# Patient Record
Sex: FEMALE | Race: WHITE | Employment: UNEMPLOYED | URBAN - METROPOLITAN AREA
[De-identification: names, ages, dates, MRNs, and addresses within clinical notes are randomized per-mention and may not be internally consistent; named-entity substitution may affect disease eponyms.]

---

## 2019-05-01 ENCOUNTER — HOSPITAL ENCOUNTER (OUTPATIENT)
Age: 59
Discharge: HOME | End: 2019-05-01
Payer: COMMERCIAL

## 2019-05-01 ENCOUNTER — HOSPITAL ENCOUNTER (OUTPATIENT)
Dept: PREADMISSION TESTING | Age: 59
Discharge: HOME | End: 2019-05-05
Payer: COMMERCIAL

## 2019-05-01 VITALS
SYSTOLIC BLOOD PRESSURE: 161 MMHG | WEIGHT: 238 LBS | BODY MASS INDEX: 37.35 KG/M2 | DIASTOLIC BLOOD PRESSURE: 87 MMHG | HEIGHT: 67 IN | OXYGEN SATURATION: 100 % | RESPIRATION RATE: 20 BRPM | HEART RATE: 55 BPM | TEMPERATURE: 98.5 F

## 2019-05-01 LAB
ANION GAP SERPL CALCULATED.3IONS-SCNC: 12 MMOL/L (ref 9–17)
BUN BLDV-MCNC: 13 MG/DL (ref 6–20)
BUN/CREAT BLD: NORMAL (ref 9–20)
CALCIUM SERPL-MCNC: 9.4 MG/DL (ref 8.6–10.4)
CHLORIDE BLD-SCNC: 104 MMOL/L (ref 98–107)
CO2: 25 MMOL/L (ref 20–31)
CREAT SERPL-MCNC: 0.72 MG/DL (ref 0.5–0.9)
GFR AFRICAN AMERICAN: >60 ML/MIN
GFR NON-AFRICAN AMERICAN: >60 ML/MIN
GFR SERPL CREATININE-BSD FRML MDRD: NORMAL ML/MIN/{1.73_M2}
GFR SERPL CREATININE-BSD FRML MDRD: NORMAL ML/MIN/{1.73_M2}
GLUCOSE BLD-MCNC: 96 MG/DL (ref 70–99)
HCT VFR BLD CALC: 40.2 % (ref 36.3–47.1)
HEMOGLOBIN: 13.3 G/DL (ref 11.9–15.1)
POTASSIUM SERPL-SCNC: 4.2 MMOL/L (ref 3.7–5.3)
SODIUM BLD-SCNC: 141 MMOL/L (ref 135–144)

## 2019-05-01 PROCEDURE — 85014 HEMATOCRIT: CPT

## 2019-05-01 PROCEDURE — 85018 HEMOGLOBIN: CPT

## 2019-05-01 PROCEDURE — 36415 COLL VENOUS BLD VENIPUNCTURE: CPT

## 2019-05-01 PROCEDURE — 80048 BASIC METABOLIC PNL TOTAL CA: CPT

## 2019-05-01 PROCEDURE — 93005 ELECTROCARDIOGRAM TRACING: CPT

## 2019-05-01 RX ORDER — SODIUM CHLORIDE, SODIUM LACTATE, POTASSIUM CHLORIDE, CALCIUM CHLORIDE 600; 310; 30; 20 MG/100ML; MG/100ML; MG/100ML; MG/100ML
1000 INJECTION, SOLUTION INTRAVENOUS CONTINUOUS
Status: CANCELLED | OUTPATIENT
Start: 2019-05-01

## 2019-05-01 RX ORDER — ACETAMINOPHEN 500 MG
1000 TABLET ORAL EVERY 6 HOURS PRN
COMMUNITY

## 2019-05-01 RX ORDER — BIOTIN 10 MG
1 TABLET ORAL DAILY
COMMUNITY

## 2019-05-01 NOTE — H&P
History and Physical    Pt Name: Ivonne Curran  MRN: [id number]  YOB: 1960  Date of evaluation: 5/1/2019    SUBJECTIVE:   History of Chief Complaint:    Patient complains of lumbar pain, BLE pain. 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 She has had cervical spine surgery in the past.  She says that she has worse symptoms in the PM, has failed conservative treatment. She has been scheduled for lumbar fusion. Past Medical History    has a past medical history of Cervical disc disease, Depression, GERD (gastroesophageal reflux disease), Hashimoto's disease, Hypertension, Lumbar spinal stenosis, Sleep apnea, and Thyroid disease. Past Surgical History   has a past surgical history that includes Hysterectomy, total abdominal (2004); Cholecystectomy, laparoscopic (1994); cervical fusion (2016); Colonoscopy; and Tonsillectomy and adenoidectomy. Medications  Prior to Admission medications    Medication Sig Start Date End Date Taking? Authorizing Provider   Sertraline HCl (ZOLOFT PO) Take 100 mg by mouth daily   Yes Historical Provider, MD   OMEPRAZOLE PO Take 20 mg by mouth nightly   Yes Historical Provider, MD   Calcium Carbonate (CALCIUM 600 PO) Take 1 tablet by mouth daily   Yes Historical Provider, MD   Multiple Vitamins-Minerals (MULTIVITAMIN ADULT) CHEW Take 1 tablet by mouth daily   Yes Historical Provider, MD   Cholecalciferol (VITAMIN D3 PO) Take 800 Units by mouth daily   Yes Historical Provider, MD   GABAPENTIN PO Take 300 mg by mouth 3 times daily   Yes Historical Provider, MD   acetaminophen (TYLENOL) 500 MG tablet Take 1,000 mg by mouth every 6 hours as needed for Pain   Yes Historical Provider, MD   Ibuprofen (MOTRIN PO) Take 800 mg by mouth nightly as needed   Yes Historical Provider, MD     Allergies  is allergic to codeine and other.   Family History  family history includes Arthritis in her father and mother; Cancer in her maternal grandfather and paternal grandmother; Diabetes in her maternal grandmother; Heart Attack in her paternal grandfather; Heart Failure in her maternal grandmother; High Blood Pressure in her father, maternal grandmother, and sister; Thyroid Cancer in her brother; Thyroid Disease in her brother, sister, and sister. Social History   reports that she quit smoking about 40 years ago. Her smoking use included cigarettes. She started smoking about 42 years ago. She has never used smokeless tobacco.     reports that she drank alcohol. reports that she has current or past drug history. Marital Status   Occupation recently stopped working, RN    OBJECTIVE:   VITALS:  height is 5' 7\" (1.702 m) and weight is 238 lb (108 kg). Her oral temperature is 98.5 °F (36.9 °C). Her blood pressure is 161/87 (abnormal) and her pulse is 55. Her respiration is 20 and oxygen saturation is 100%. CONSTITUTIONAL:alert & oriented x 3, no acute distress. Very pleasant. SKIN:  Warm and dry, no rashes on exposed areas of skin. HEAD:  Normocephalic, atraumatic   EYES: PERRL. EOMs intact. EARS:  Hearing grossly WNL. TM intact and clear bilaterally. NOSE:  Nares patent. No rhinorrhea. MOUTH/THROAT:  benign  NECK:supple, no lymphadenopathy  LUNGS: Clear to auscultation bilaterally, no wheezes. CARDIOVASCULAR: Heart sounds are normal.  Regular rate and rhythm without murmur. ABDOMEN: soft, non tender, non distended. EXTREMITIES: no edema bilateral lower extremities. Testing:   EK19  Labs pending: drawn 2019     IMPRESSIONS:   1. Lumbar disc disease  2.  has a past medical history of Cervical disc disease, Depression, GERD (gastroesophageal reflux disease), Hashimoto's disease, Hypertension (), Lumbar spinal stenosis (2019), Sleep apnea, and Thyroid disease. PLANS:   1.  Lumbar fusion    ARIANA VO PA-C  Electronically signed 2019 at 11:15 AM

## 2019-05-02 LAB
EKG ATRIAL RATE: 61 BPM
EKG P AXIS: 72 DEGREES
EKG P-R INTERVAL: 172 MS
EKG Q-T INTERVAL: 444 MS
EKG QRS DURATION: 96 MS
EKG QTC CALCULATION (BAZETT): 446 MS
EKG R AXIS: 40 DEGREES
EKG T AXIS: 51 DEGREES
EKG VENTRICULAR RATE: 61 BPM

## 2025-05-14 ENCOUNTER — OFFICE VISIT (OUTPATIENT)
Dept: NEUROLOGY | Age: 65
End: 2025-05-14
Payer: COMMERCIAL

## 2025-05-14 VITALS
HEIGHT: 67 IN | BODY MASS INDEX: 36.88 KG/M2 | SYSTOLIC BLOOD PRESSURE: 122 MMHG | DIASTOLIC BLOOD PRESSURE: 74 MMHG | HEART RATE: 64 BPM | WEIGHT: 235 LBS

## 2025-05-14 DIAGNOSIS — R41.89 SUBJECTIVE MEMORY COMPLAINTS: Primary | ICD-10-CM

## 2025-05-14 DIAGNOSIS — Z91.89 DRIVING SAFETY ISSUE: ICD-10-CM

## 2025-05-14 DIAGNOSIS — F39 AFFECTIVE DISORDER: ICD-10-CM

## 2025-05-14 DIAGNOSIS — G25.2 ACTION TREMOR: ICD-10-CM

## 2025-05-14 PROCEDURE — 3017F COLORECTAL CA SCREEN DOC REV: CPT | Performed by: PSYCHIATRY & NEUROLOGY

## 2025-05-14 PROCEDURE — 99204 OFFICE O/P NEW MOD 45 MIN: CPT | Performed by: PSYCHIATRY & NEUROLOGY

## 2025-05-14 PROCEDURE — G8427 DOCREV CUR MEDS BY ELIG CLIN: HCPCS | Performed by: PSYCHIATRY & NEUROLOGY

## 2025-05-14 PROCEDURE — 1036F TOBACCO NON-USER: CPT | Performed by: PSYCHIATRY & NEUROLOGY

## 2025-05-14 PROCEDURE — G8417 CALC BMI ABV UP PARAM F/U: HCPCS | Performed by: PSYCHIATRY & NEUROLOGY

## 2025-05-14 RX ORDER — BUPROPION HYDROCHLORIDE 300 MG/1
300 TABLET ORAL EVERY MORNING
COMMUNITY
Start: 2025-02-19

## 2025-05-14 RX ORDER — LAMOTRIGINE 25 MG/1
TABLET ORAL
COMMUNITY

## 2025-05-14 RX ORDER — LAMOTRIGINE 100 MG/1
100 TABLET ORAL DAILY
COMMUNITY
Start: 2025-04-23

## 2025-05-14 RX ORDER — BUSPIRONE HYDROCHLORIDE 15 MG/1
15 TABLET ORAL 2 TIMES DAILY
COMMUNITY

## 2025-05-14 RX ORDER — LISINOPRIL 10 MG/1
10 TABLET ORAL EVERY MORNING
COMMUNITY

## 2025-05-14 RX ORDER — LEVOTHYROXINE SODIUM 137 UG/1
137 TABLET ORAL EVERY MORNING
COMMUNITY

## 2025-05-14 RX ORDER — HYDROCHLOROTHIAZIDE 25 MG/1
25 TABLET ORAL EVERY MORNING
COMMUNITY

## 2025-05-14 RX ORDER — ARIPIPRAZOLE 2 MG/1
2 TABLET ORAL NIGHTLY
COMMUNITY
Start: 2025-04-24

## 2025-05-14 NOTE — PROGRESS NOTES
NEUROLOGY CONSULT  Patient Name:       Emelia Donnelly  :        1960  Clinic Visit Date:    2025          Dear Nik Eldridge MD     I had the opportunity to see your patient, Ms. Emelia Donnelly in neurology consultation today. As you know she  is a 64 y.o.  female with c/o memory difficulties.  She presented to clinic by herself. Most of the history is obtained from the patient herself.   She reports experiencing memory issues, despite having passed a neuropsychology exam in 2024 with superior vocabulary and average overall performance. She frequently misplaces her phone and has been reprimanded by her neurologist for using semantic paraphasias. Often, she substitutes incorrect words, such as \"flower\" for \"grass,\" and occasionally struggles with reading comprehension. Visual-spatial difficulties are also noted, such as veering to the left while driving and parking. Her confusion levels vary daily. Currently on Lyrica, she believes it may be contributing to her symptoms and is attempting to reduce her dosage to one nightly pill. Despite this, she continues to experience a foggy sensation.   She frequently repeats stories and forgets steps in processes. She volunteers at her [employer] but often forgets where she has been. Previously a nurse and a , she now struggles to recall scientific names of plants and ordinary things. Multiple MRIs were conducted due to suspected MS, as she veers to the left while walking and experiences numbness in her left foot. However, MS has been ruled out by MS Specialist, Dr. Santiago following brain, spine MRIs and lumbar puncture. She lives with her  and manages her daily activities independently.  She is a highly functioning individual and she has been independent of basic ADLs and instrumental ADLs.   Historically, she has been a focused person, concentrating on one thing at a time.   She had Bachelor's degree in nursing, with some